# Patient Record
Sex: FEMALE | Employment: FULL TIME | URBAN - METROPOLITAN AREA
[De-identification: names, ages, dates, MRNs, and addresses within clinical notes are randomized per-mention and may not be internally consistent; named-entity substitution may affect disease eponyms.]

---

## 2017-03-13 RX ORDER — TRIAMCINOLONE ACETONIDE 1 MG/G
CREAM TOPICAL
Qty: 453 G | Refills: 0 | Status: SHIPPED | OUTPATIENT
Start: 2017-03-13 | End: 2017-06-26 | Stop reason: ALTCHOICE

## 2017-04-07 RX ORDER — CLOBETASOL PROPIONATE 0.5 MG/G
OINTMENT TOPICAL
Qty: 15 G | Refills: 1 | Status: SHIPPED | OUTPATIENT
Start: 2017-04-07 | End: 2017-12-18 | Stop reason: SDUPTHER

## 2017-06-01 DIAGNOSIS — Z00.00 ROUTINE GENERAL MEDICAL EXAMINATION AT A HEALTH CARE FACILITY: ICD-10-CM

## 2017-06-23 ENCOUNTER — HOSPITAL ENCOUNTER (OUTPATIENT)
Dept: LAB | Age: 37
Discharge: HOME | End: 2017-06-23

## 2017-06-23 LAB
A-G RATIO,AGRAT: 1.4 RATIO (ref 1.1–2.6)
ABSOLUTE LYMPHOCYTE COUNT, 10803: 2 K/UL (ref 1–4.8)
ALBUMIN SERPL-MCNC: 4.1 G/DL (ref 3.5–5)
ALP SERPL-CCNC: 65 U/L (ref 25–115)
ALT SERPL-CCNC: 13 U/L (ref 5–40)
ANION GAP SERPL CALC-SCNC: 14 MMOL/L
AST SERPL W P-5'-P-CCNC: 20 U/L (ref 10–37)
BASOPHILS # BLD: 0 K/UL (ref 0–0.2)
BASOPHILS NFR BLD: 0 % (ref 0–2)
BILIRUB SERPL-MCNC: 0.2 MG/DL (ref 0.2–1.2)
BUN SERPL-MCNC: 13 MG/DL (ref 6–22)
CALCIUM SERPL-MCNC: 9.1 MG/DL (ref 8.4–10.5)
CHLORIDE SERPL-SCNC: 99 MMOL/L (ref 98–110)
CHOLEST SERPL-MCNC: 215 MG/DL (ref 110–200)
CO2 SERPL-SCNC: 26 MMOL/L (ref 20–32)
CREAT SERPL-MCNC: 0.6 MG/DL (ref 0.5–1.2)
EOSINOPHIL # BLD: 0.2 K/UL (ref 0–0.5)
EOSINOPHIL NFR BLD: 4 % (ref 0–6)
ERYTHROCYTE [DISTWIDTH] IN BLOOD BY AUTOMATED COUNT: 12.4 % (ref 10–16)
GFRAA, 66117: >60
GFRNA, 66118: >60
GLOBULIN,GLOB: 2.9 G/DL (ref 2–4)
GLUCOSE SERPL-MCNC: 86 MG/DL (ref 65–99)
GRANULOCYTES,GRANS: 47 % (ref 40–75)
HCT VFR BLD AUTO: 41 % (ref 35.1–46.5)
HDLC SERPL-MCNC: 95 MG/DL (ref 40–59)
HGB BLD-MCNC: 13.7 G/DL (ref 11.7–15.5)
LDLC SERPL CALC-MCNC: 110 MG/DL (ref 50–99)
LYMPHOCYTES, LYMLT: 38 % (ref 27–45)
MCH RBC QN AUTO: 33 PG (ref 26–34)
MCHC RBC AUTO-ENTMCNC: 33 G/DL (ref 32–36)
MCV RBC AUTO: 97 FL (ref 80–95)
MONOCYTES # BLD: 0.6 K/UL (ref 0.1–0.9)
MONOCYTES NFR BLD: 12 % (ref 3–9)
NEUTROPHILS # BLD AUTO: 2.4 K/UL (ref 1.8–7.7)
PLATELET # BLD AUTO: 331 K/UL (ref 140–440)
PMV BLD AUTO: 10.7 FL (ref 6–10.8)
POTASSIUM SERPL-SCNC: 3.6 MMOL/L (ref 3.5–5.5)
PROT SERPL-MCNC: 7 G/DL (ref 6.4–8.3)
RBC # BLD AUTO: 4.22 M/UL (ref 3.8–5.2)
SENTARA SPECIMEN COL,SENBCF: NORMAL
SODIUM SERPL-SCNC: 139 MMOL/L (ref 133–145)
TRIGL SERPL-MCNC: 51 MG/DL (ref 40–149)
TSH SERPL DL<=0.005 MIU/L-ACNC: 2.44 MCU/ML (ref 0.27–4.2)
VLDLC SERPL CALC-MCNC: 10 MG/DL (ref 8–30)
WBC # BLD AUTO: 5.2 K/UL (ref 4–11)

## 2017-06-23 PROCEDURE — 99001 SPECIMEN HANDLING PT-LAB: CPT | Performed by: INTERNAL MEDICINE

## 2017-06-26 ENCOUNTER — OFFICE VISIT (OUTPATIENT)
Dept: INTERNAL MEDICINE CLINIC | Age: 37
End: 2017-06-26

## 2017-06-26 VITALS
RESPIRATION RATE: 12 BRPM | OXYGEN SATURATION: 94 % | TEMPERATURE: 98.1 F | HEIGHT: 66 IN | DIASTOLIC BLOOD PRESSURE: 62 MMHG | BODY MASS INDEX: 24.46 KG/M2 | SYSTOLIC BLOOD PRESSURE: 108 MMHG | WEIGHT: 152.2 LBS | HEART RATE: 93 BPM

## 2017-06-26 DIAGNOSIS — Z00.00 ROUTINE GENERAL MEDICAL EXAMINATION AT A HEALTH CARE FACILITY: Primary | ICD-10-CM

## 2017-06-26 NOTE — MR AVS SNAPSHOT
Visit Information Date & Time Provider Department Dept. Phone Encounter #  
 6/26/2017 10:00 AM Jonathan Salinas MD [facility] [address] [id number] Upcoming Health Maintenance Date Due Pneumococcal 19-64 Medium Risk (1 of 1 - PPSV23) 7/21/2000 DTaP/Tdap/Td series (1 - Tdap) 7/21/2002 PAP AKA CERVICAL CYTOLOGY 1/9/2017 INFLUENZA AGE 9 TO ADULT 8/1/2017 Allergies as of 6/26/2017  Review Complete On: 6/26/2017 By: Jonathan Salinas MD  
 No Known Allergies Current Immunizations  Reviewed on 6/9/2016 No immunizations on file. Not reviewed this visit You Were Diagnosed With   
  
 Codes Comments Routine general medical examination at a health care facility    -  Primary ICD-10-CM: Z00.00 ICD-9-CM: V70.0 Vitals BP Pulse Temp Resp Height(growth percentile) Weight(growth percentile) 108/62 93 98.1 °F (36.7 °C) (Oral) 12 5' 6\" (1.676 m) 152 lb 3.2 oz (69 kg) LMP SpO2 BMI OB Status Smoking Status 06/21/2017 94% 24.57 kg/m2 Having regular periods Never Smoker Vitals History BMI and BSA Data Body Mass Index Body Surface Area 24.57 kg/m 2 1.79 m 2 Preferred Pharmacy Pharmacy Name Phone [facility], [address] [place] [address] [phone] Your Updated Medication List  
  
   
This list is accurate as of: 6/26/17 10:36 AM.  Always use your most recent med list.  
  
  
  
  
 albuterol 90 mcg/actuation inhaler Commonly known as:  PROVENTIL HFA, VENTOLIN HFA, PROAIR HFA Take 2 Puffs by inhalation every four (4) hours as needed for Wheezing. clobetasol 0.05 % ointment Commonly known as:  TEMOVATE  
apply to affected area twice a day TRI-SPRINTEC (28) 0.18/0.215/0.25 mg-35 mcg (28) Tab Generic drug:  norgestimate-ethinyl estradiol Take  by mouth. Introducing [facility] & HE[facility] SERVICES! Dear Moriah [place]: Thank you for requesting a [employer] account. Our records indicate that you already have an active [employer] account. You can access your account anytime at [email or web address] Did you know that you can access your hospital and ER discharge instructions at any time in ELERTS? You can also review all of your test results from your hospital stay or ER visit. Additional Information If you have questions, please visit the Frequently Asked Questions section of the ELERTS website at [email or web address] Remember, ELERTS is NOT to be used for urgent needs. For medical emergencies, dial 911. Now available from your iPhone and Android! Please provide this summary of care documentation to your next provider. Your primary care clinician is listed as Ramírez Varela. Samanta Hayes. If you have any questions after today's visit, please call [phone].

## 2017-10-26 ENCOUNTER — TELEPHONE (OUTPATIENT)
Dept: INTERNAL MEDICINE CLINIC | Age: 37
End: 2017-10-26

## 2017-10-26 ENCOUNTER — OFFICE VISIT (OUTPATIENT)
Dept: INTERNAL MEDICINE CLINIC | Age: 37
End: 2017-10-26

## 2017-10-26 VITALS
WEIGHT: 155.6 LBS | HEIGHT: 66 IN | OXYGEN SATURATION: 96 % | BODY MASS INDEX: 25.01 KG/M2 | DIASTOLIC BLOOD PRESSURE: 68 MMHG | TEMPERATURE: 97.6 F | RESPIRATION RATE: 12 BRPM | SYSTOLIC BLOOD PRESSURE: 110 MMHG | HEART RATE: 51 BPM

## 2017-10-26 DIAGNOSIS — H69.82 DYSFUNCTION OF LEFT EUSTACHIAN TUBE: Primary | ICD-10-CM

## 2017-10-26 RX ORDER — PREDNISONE 10 MG/1
TABLET ORAL
Qty: 21 TAB | Refills: 0 | Status: SHIPPED | OUTPATIENT
Start: 2017-10-26 | End: 2019-05-06 | Stop reason: ALTCHOICE

## 2017-10-26 NOTE — MR AVS SNAPSHOT
Visit Information Date & Time Provider Department Dept. Phone Encounter #  
 10/26/2017 11:00 AM Marcin Leonard MD [facility] [place] [id number] Upcoming Health Maintenance Date Due Pneumococcal 19-64 Medium Risk (1 of 1 - PPSV23) 7/21/2000 DTaP/Tdap/Td series (1 - Tdap) 7/21/2002 INFLUENZA AGE 9 TO ADULT 8/1/2017 PAP AKA CERVICAL CYTOLOGY 5/4/2020 Allergies as of 10/26/2017  Review Complete On: 10/26/2017 By: Marcin Leonard MD  
 No Known Allergies Current Immunizations  Reviewed on 6/9/2016 No immunizations on file. Not reviewed this visit Vitals BP Pulse Temp Resp Height(growth percentile) Weight(growth percentile) 110/68 (!) 51 97.6 °F (36.4 °C) (Oral) 12 5' 6\" (1.676 m) 155 lb 9.6 oz (70.6 kg) SpO2 BMI OB Status Smoking Status 96% 25.11 kg/m2 Having regular periods Never Smoker Vitals History BMI and BSA Data Body Mass Index Body Surface Area  
 25.11 kg/m 2 1.81 m 2 Preferred Pharmacy Pharmacy Name Phone [facility], [facility] [phone] Your Updated Medication List  
  
   
This list is accurate as of: 10/26/17 11:38 AM.  Always use your most recent med list.  
  
  
  
  
 albuterol 90 mcg/actuation inhaler Commonly known as:  PROVENTIL HFA, VENTOLIN HFA, PROAIR HFA Take 2 Puffs by inhalation every four (4) hours as needed for Wheezing. clobetasol 0.05 % ointment Commonly known as:  TEMOVATE  
apply to affected area twice a day TRI-SPRINTEC (28) 0.18/0.215/0.25 mg-35 mcg (28) Tab Generic drug:  norgestimate-ethinyl estradiol Take  by mouth. Introducing [facility]! Dear Eloy Cardozo: 
Thank you for requesting a [employer] account. Our records indicate that you already have an active [employer] account. You can access your account anytime at [email or web address] Did you know that you can access your hospital and ER discharge instructions at any time in Own[employer]? You can also review all of your test results from your hospital stay or ER visit. Additional Information If you have questions, please visit the Frequently Asked Questions section of the Owned it website at [email or web address] Remember, Owned it is NOT to be used for urgent needs. For medical emergencies, dial 911. Now available from your iPhone and Android! Please provide this summary of care documentation to your next provider. Your primary care clinician is listed as Josafat James. If you have any questions after today's visit, please call [phone].

## 2017-10-26 NOTE — PROGRESS NOTES
Blossom Luong 1980, is a 40 y.o. female, who is seen today for evaluation of left ear discomfort. This started a week ago and has not changed since then. No real bad pain chest fullness/discomfort. Hearing is normal.  She does not have any nasal congestion or sore throat or chills or fever or other symptoms. She has not tried any medicine for this, she has not had an ear infection for over 20 years. Past Medical History:   Diagnosis Date    Asthma age 14    Chronic pain     back pain     Current Outpatient Prescriptions   Medication Sig Dispense Refill    clobetasol (TEMOVATE) 0.05 % ointment apply to affected area twice a day 15 g 1    albuterol (PROVENTIL HFA, VENTOLIN HFA, PROAIR HFA) 90 mcg/actuation inhaler Take 2 Puffs by inhalation every four (4) hours as needed for Wheezing. 1 Inhaler 2    norgestimate-ethinyl estradiol (TRI-SPRINTEC, 28,) 0.18/0.215/0.25 mg-35 mcg (28) tablet Take  by mouth. Visit Vitals    /68    Pulse (!) 51    Temp 97.6 °F (36.4 °C) (Oral)    Resp 12    Ht 5' 6\" (1.676 m)    Wt 155 lb 9.6 oz (70.6 kg)    SpO2 96%    BMI 25.11 kg/m2     The ear canal appears normal on the left and the tympanic membrane is slightly dull. No redness. Ear canal and tympanic membrane on the right appears normal with good light reflex. Nasal passages reveal minimal tenacious clear secretions bilaterally. Pharynx reveals no redness exudate or drainage. Neck reveals no adenopathy. No tenderness to press on the left ear. Assessment: Eustachian tube dysfunction probably from allergies. Will treat with prednisone 30 mg daily for a week, if this is not working she should try Sudafed or other decongestant. Her weight is normal with body mass index of 25. She will maintain normal weight. Robinson Patel MD FACP    Please note: This document has been produced using voice recognition software.  Unrecognized errors in transcription may be present.

## 2017-11-04 NOTE — PROGRESS NOTES
Laureano Belfast 1980, is a 29 y.o. female, who is seen today for routine physical exam.  She feels generally well but is working 12 hour shifts at night, 7-7 at the [employer] center. She is here with her 2 young children today. They keep her awfully busy as well. She feels well. She did have respiratory problems during the winter with exacerbation of asthma but is doing well now. She notes that her mother has had one lobe of her thyroid removed is a large nodule in the other lobe. Past Medical History:   Diagnosis Date    Asthma age 14    Chronic pain     back pain     Past Surgical History:   Procedure Laterality Date    HX APPENDECTOMY  1997    HX CHOLECYSTECTOMY  2007     Current Outpatient Prescriptions   Medication Sig Dispense Refill    clobetasol (TEMOVATE) 0.05 % ointment apply to affected area twice a day 15 g 1    albuterol (PROVENTIL HFA, VENTOLIN HFA, PROAIR HFA) 90 mcg/actuation inhaler Take 2 Puffs by inhalation every four (4) hours as needed for Wheezing. 1 Inhaler 2    norgestimate-ethinyl estradiol (TRI-SPRINTEC, 28,) 0.18/0.215/0.25 mg-35 mcg (28) tablet Take  by mouth. No Known Allergies  Social History     Social History    Marital status:      Spouse name: N/A    Number of children: N/A    Years of education: N/A     Social History Main Topics    Smoking status: Never Smoker    Smokeless tobacco: Never Used    Alcohol use Yes      Comment: social    Drug use: No    Sexual activity: Yes     Partners: Male     Birth control/ protection: Pill     Other Topics Concern    None     Social History Narrative     Visit Vitals    /62    Pulse 93    Temp 98.1 °F (36.7 °C) (Oral)    Resp 12    Ht 5' 6\" (1.676 m)    Wt 152 lb 3.2 oz (69 kg)    SpO2 94%    BMI 24.57 kg/m2     The patient is a well-developed well-nourished female in no apparent distress. HEENT: Pupils are equal and react to light and extraocular movements are full.   Ear canals and tympanic membranes appear normal. Oral cavity appears normal with no oral lesions. Neck: Carotids are 2+ without bruits. No adenopathy or thyromegaly. Lungs are clear to percussion. I hear no wheezing, rales or rhonchi. Heart reveals a regular rhythm with no murmur, gallop, click or rub. The apical impulse is in the fifth interspace at the midclavicular line. Abdomen is soft and nontender with no hepatosplenomegaly or masses. Bowel sounds are normoactive and there is no distention or tympany. Extremities reveal no clubbing cyanosis or edema. Pulses are 2+. Skin reveals no suspicious skin growths. Breasts reveal no masses, skin or nipple abnormalities. No axillary adenopathy. Laboratory shows normal CBC normal chemistries with glucose 86 LDL cholesterol 110 HDL 95 TSH 2.44    Assessment: Healthy young female, will continue fairly healthy diet, avoid significant weight gain. Follow-up in 1 year for physical    Roverto Nathan MD FACP    Please note: This document has been produced using voice recognition software. Unrecognized errors in transcription may be present. Unknown if ever smoked

## 2017-12-18 RX ORDER — CLOBETASOL PROPIONATE 0.5 MG/G
OINTMENT TOPICAL
Qty: 15 G | Refills: 0 | Status: SHIPPED | OUTPATIENT
Start: 2017-12-18 | End: 2018-03-05 | Stop reason: SDUPTHER

## 2018-03-05 RX ORDER — CLOBETASOL PROPIONATE 0.5 MG/G
OINTMENT TOPICAL
Qty: 15 G | Refills: 0 | Status: SHIPPED | OUTPATIENT
Start: 2018-03-05 | End: 2018-09-11 | Stop reason: SDUPTHER

## 2018-09-11 RX ORDER — CLOBETASOL PROPIONATE 0.5 MG/G
OINTMENT TOPICAL
Qty: 15 G | Refills: 0 | Status: SHIPPED | OUTPATIENT
Start: 2018-09-11 | End: 2018-12-12 | Stop reason: SDUPTHER

## 2018-12-12 RX ORDER — CLOBETASOL PROPIONATE 0.5 MG/G
OINTMENT TOPICAL
Qty: 15 G | Refills: 0 | Status: SHIPPED | OUTPATIENT
Start: 2018-12-12 | End: 2019-03-05 | Stop reason: SDUPTHER

## 2019-03-06 RX ORDER — CLOBETASOL PROPIONATE 0.5 MG/G
OINTMENT TOPICAL
Qty: 15 G | Refills: 0 | Status: SHIPPED | OUTPATIENT
Start: 2019-03-06 | End: 2019-09-30 | Stop reason: SDUPTHER

## 2019-03-11 DIAGNOSIS — R63.5 WEIGHT GAIN: ICD-10-CM

## 2019-03-11 DIAGNOSIS — Z00.00 ROUTINE GENERAL MEDICAL EXAMINATION AT A HEALTH CARE FACILITY: Primary | ICD-10-CM

## 2019-05-02 ENCOUNTER — HOSPITAL ENCOUNTER (OUTPATIENT)
Dept: LAB | Age: 39
Discharge: HOME | End: 2019-05-02

## 2019-05-02 LAB — SENTARA SPECIMEN COL,SENBCF: NORMAL

## 2019-05-02 PROCEDURE — 99001 SPECIMEN HANDLING PT-LAB: CPT

## 2019-05-03 LAB
A-G RATIO,AGRAT: 2 RATIO (ref 1.1–2.6)
ABSOLUTE LYMPHOCYTE COUNT, 10803: 2 K/UL (ref 1–4.8)
ALBUMIN SERPL-MCNC: 4.5 G/DL (ref 3.5–5)
ALP SERPL-CCNC: 68 U/L (ref 25–115)
ALT SERPL-CCNC: 18 U/L (ref 5–40)
ANION GAP SERPL CALC-SCNC: 15 MMOL/L
AST SERPL W P-5'-P-CCNC: 18 U/L (ref 10–37)
BASOPHILS # BLD: 0 K/UL (ref 0–0.2)
BASOPHILS NFR BLD: 0 % (ref 0–2)
BILIRUB SERPL-MCNC: 0.7 MG/DL (ref 0.2–1.2)
BUN SERPL-MCNC: 8 MG/DL (ref 6–22)
CALCIUM SERPL-MCNC: 9.2 MG/DL (ref 8.4–10.5)
CHLORIDE SERPL-SCNC: 99 MMOL/L (ref 98–110)
CHOLEST SERPL-MCNC: 190 MG/DL (ref 110–200)
CO2 SERPL-SCNC: 26 MMOL/L (ref 20–32)
CREAT SERPL-MCNC: 0.5 MG/DL (ref 0.5–1.2)
EOSINOPHIL # BLD: 0 K/UL (ref 0–0.5)
EOSINOPHIL NFR BLD: 1 % (ref 0–6)
ERYTHROCYTE [DISTWIDTH] IN BLOOD BY AUTOMATED COUNT: 12.7 % (ref 10–15.5)
GFRAA, 66117: >60
GFRNA, 66118: >60
GLOBULIN,GLOB: 2.3 G/DL (ref 2–4)
GLUCOSE SERPL-MCNC: 79 MG/DL (ref 70–99)
GRANULOCYTES,GRANS: 64 % (ref 40–75)
HCT VFR BLD AUTO: 41.7 % (ref 35.1–46.5)
HDLC SERPL-MCNC: 2.5 MG/DL (ref 0–5)
HDLC SERPL-MCNC: 75 MG/DL (ref 40–59)
HGB BLD-MCNC: 13.5 G/DL (ref 11.7–15.5)
LDLC SERPL CALC-MCNC: 101 MG/DL (ref 50–99)
LYMPHOCYTES, LYMLT: 27 % (ref 20–45)
MCH RBC QN AUTO: 32 PG (ref 26–34)
MCHC RBC AUTO-ENTMCNC: 32 G/DL (ref 31–36)
MCV RBC AUTO: 99 FL (ref 80–95)
MONOCYTES # BLD: 0.6 K/UL (ref 0.1–1)
MONOCYTES NFR BLD: 8 % (ref 3–12)
NEUTROPHILS # BLD AUTO: 4.7 K/UL (ref 1.8–7.7)
PLATELET # BLD AUTO: 333 K/UL (ref 140–440)
PMV BLD AUTO: 10.9 FL (ref 9–13)
POTASSIUM SERPL-SCNC: 4.1 MMOL/L (ref 3.5–5.5)
PROT SERPL-MCNC: 6.8 G/DL (ref 6.4–8.3)
RBC # BLD AUTO: 4.23 M/UL (ref 3.8–5.2)
SODIUM SERPL-SCNC: 140 MMOL/L (ref 133–145)
T4 FREE SERPL-MCNC: 1.2 NG/DL (ref 0.9–1.8)
TRIGL SERPL-MCNC: 67 MG/DL (ref 40–149)
TSH SERPL DL<=0.005 MIU/L-ACNC: 1 MCU/ML (ref 0.27–4.2)
VLDLC SERPL CALC-MCNC: 13 MG/DL (ref 8–30)
WBC # BLD AUTO: 7.4 K/UL (ref 4–11)

## 2019-05-06 ENCOUNTER — OFFICE VISIT (OUTPATIENT)
Dept: INTERNAL MEDICINE CLINIC | Age: 39
End: 2019-05-06

## 2019-05-06 VITALS
BODY MASS INDEX: 26.03 KG/M2 | OXYGEN SATURATION: 96 % | HEIGHT: 66 IN | SYSTOLIC BLOOD PRESSURE: 104 MMHG | HEART RATE: 52 BPM | TEMPERATURE: 97.9 F | DIASTOLIC BLOOD PRESSURE: 70 MMHG | RESPIRATION RATE: 14 BRPM | WEIGHT: 162 LBS

## 2019-05-06 DIAGNOSIS — J45.20 MILD INTERMITTENT ASTHMA WITHOUT COMPLICATION: ICD-10-CM

## 2019-05-06 DIAGNOSIS — Z00.00 ROUTINE GENERAL MEDICAL EXAMINATION AT A HEALTH CARE FACILITY: Primary | ICD-10-CM

## 2019-05-06 NOTE — PROGRESS NOTES
Chief Complaint Patient presents with  Complete Physical  
  ROOM 10   Yearly physical with lab results. Patient states that her heart rate has been really low for the past month. Patient states that when she runs her chest gets tight and she thinks it is he asthma. Dr. Jenny Bocanegra notified. Health Maintenance Due Topic Date Due  Pneumococcal 0-64 years (1 of 1 - PPSV23) 07/21/1987  
 DTaP/Tdap/Td series (1 - Tdap) 07/21/2002 1. Have you been to the ER, urgent care clinic or hospitalized since your last visit? NO.  
 
2. Have you seen or consulted any other health care providers outside of the [address] [place] [address] since your last visit (Include any pap smears or colon screening)? YES, GYN last week for PAP. Do you have an Advanced Directive? NO Would you like information on Advanced Directives? NO Learning Assessment 5/6/2019 PRIMARY LEARNER Patient HIGHEST LEVEL OF EDUCATION - PRIMARY LEARNER  SOME COLLEGE  
BARRIERS PRIMARY LEARNER NONE  
CO-LEARNER CAREGIVER No  
PRIMARY LANGUAGE ENGLISH  NEED No  
LEARNER PREFERENCE PRIMARY READING  
  DEMONSTRATION  
ANSWERED BY patient RELATIONSHIP SELF

## 2019-05-06 NOTE — PROGRESS NOTES
Shahid Vidal 1980, is a 41 y.o. female, who is seen today for routine physical exam and follow-up on asthma. Her asthma is doing well, she occasionally uses albuterol especially she is going to exercise and albuterol as a couple years old, it still works and she has not gone through her last inhaler. She has gained 7 pounds in the last 18 months and is not real happy about that. 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 She saw her gynecologist about a week ago. Past Medical History:  
Diagnosis Date  Asthma age 14  Chronic pain   
 back pain Past Surgical History:  
Procedure Laterality Date [address]  HX CHOLECYSTECTOMY  2007 Current Outpatient Medications Medication Sig Dispense Refill  Multivitamins with Fluoride (MULTI-VITAMIN PO) Take  by mouth.  Lactobacillus acidophilus (PROBIOTIC PO) Take  by mouth.  clobetasol (TEMOVATE) 0.05 % ointment apply to affected area twice a day 15 g 0  
 albuterol (PROVENTIL HFA, VENTOLIN HFA, PROAIR HFA) 90 mcg/actuation inhaler Take 2 Puffs by inhalation every four (4) hours as needed for Wheezing. 1 Inhaler 2  
 norgestimate-ethinyl estradiol (TRI-SPRINTEC, 28,) 0.18/0.215/0.25 mg-35 mcg (28) tablet Take  by mouth. No Known Allergies Social History Socioeconomic History  Marital status:  Spouse name: Not on file  Number of children: Not on file  Years of education: Not on file  Highest education level: Not on file Tobacco Use  Smoking status: Never Smoker  Smokeless tobacco: Never Used Substance and Sexual Activity  Alcohol use: Yes Comment: 1-2 drinks a month.  Drug use: No  
 Sexual activity: Yes  
  Partners: Male Birth control/protection: Pill Visit Vitals /70 (BP 1 Location: Left arm, BP Patient Position: Sitting) Pulse (!) 52 Temp 97.9 °F (36.6 °C) (Oral) Resp 14 Ht 5' 5.75\" (1.67 m) Wt 162 lb (73.5 kg) SpO2 96% BMI 26.35 kg/m² Ear canals reveal moderate wax on the right and none on the left. Oral cavity reveals no lesions. Neck reveals no adenopathy or thyromegaly. Carotids are 2+. Lungs are clear to percussion. Good breath sounds with no wheezing or crackles. Heart reveals a regular rhythm with normal S1 and S2 no murmur gallop click or rub. Apical impulse is not palpable. Abdomen is soft and nontender with no hepatosplenomegaly or masses and no bruits. Extremities reveal no clubbing cyanosis or edema. Pulses are 2+. Breasts reveal no masses no skin or nipple abnormalities and no x-ray adenopathy. Results for orders placed or performed in visit on 05/02/19 LIPID PANEL Result Value Ref Range Triglyceride 67 40 - 149 mg/dL HDL Cholesterol 75 (H) 40 - 59 mg/dL Cholesterol, total 190 110 - 200 mg/dL CHOLESTEROL/HDL 2.5 0.0 - 5.0 LDL, calculated 101 (H) 50 - 99 mg/dL VLDL, calculated 13 8 - 30 mg/dL METABOLIC PANEL, COMPREHENSIVE Result Value Ref Range Glucose 79 70 - 99 mg/dL BUN 8 6 - 22 mg/dL Creatinine 0.5 0.5 - 1.2 mg/dL Sodium 140 133 - 145 mmol/L Potassium 4.1 3.5 - 5.5 mmol/L Chloride 99 98 - 110 mmol/L  
 CO2 26 20 - 32 mmol/L  
 AST (SGOT) 18 10 - 37 U/L  
 ALT (SGPT) 18 5 - 40 U/L Alk. phosphatase 68 25 - 115 U/L Bilirubin, total 0.7 0.2 - 1.2 mg/dL Calcium 9.2 8.4 - 10.5 mg/dL Protein, total 6.8 6.4 - 8.3 g/dL Albumin 4.5 3.5 - 5.0 g/dL A-G Ratio 2.0 1.1 - 2.6 ratio Globulin 2.3 2.0 - 4.0 g/dL Anion gap 15.0 mmol/L  
 GFRAA >60.0 >60.0 GFRNA >60.0 >60.0 T4, FREE Result Value Ref Range T4, Free 1.2 0.9 - 1.8 ng/dL TSH 3RD GENERATION Result Value Ref Range TSH 1.00 0.27 - 4.20 mcU/mL CBC WITH AUTOMATED DIFF Result Value Ref Range WBC 7.4 4.0 - 11.0 K/uL  
 RBC 4.23 3.80 - 5.20 M/uL  
 HGB 13.5 11.7 - 15.5 g/dL HCT 41.7 35.1 - 46.5 % MCV 99 (H) 80 - 95 fL  
 MCH 32 26 - 34 pg MCHC 32 31 - 36 g/dL  
 RDW 12.7 10.0 - 15.5 % PLATELET 374 615 - 783 K/uL MPV 10.9 9.0 - 13.0 fL  
 NEUTROPHILS 64 40 - 75 % Lymphocytes 27 20 - 45 % MONOCYTES 8 3 - 12 % EOSINOPHILS 1 0 - 6 % BASOPHILS 0 0 - 2 %  
 ABS. NEUTROPHILS 4.7 1.8 - 7.7 K/uL ABSOLUTE LYMPHOCYTE COUNT 2.0 1.0 - 4.8 K/uL  
 ABS. MONOCYTES 0.6 0.1 - 1.0 K/uL  
 ABS. EOSINOPHILS 0.0 0.0 - 0.5 K/uL  
 ABS. BASOPHILS 0.0 0.0 - 0.2 K/uL Assessment: #1. History of asthma doing well, she will use albuterol when needed and call for refill when needed. #2. She has gained 7 pounds in the last 18 months but weight is only minimally above her ideal body weight, further weight gain and try to lose a few pounds in the next year. #3.  Her heart rate is low normal and has been noted in this range before. She is healthy and further evaluation is needed unless symptoms develop his heart rate goes down into the lower 40s. Follow-up in 1 year for a physical or sooner if needed Robinson Rich Kamiah, [address] Please note: This document has been produced using voice recognition software. Unrecognized errors in transcription may be present.

## 2019-06-24 RX ORDER — ALBUTEROL SULFATE 90 UG/1
2 AEROSOL, METERED RESPIRATORY (INHALATION)
Qty: 1 INHALER | Refills: 2 | Status: SHIPPED | OUTPATIENT
Start: 2019-06-24

## 2019-06-24 NOTE — TELEPHONE ENCOUNTER
Last Visit: 05/06/2019 with MD Costa Orellana  Next Appointment: noted to f/u in one year  Previous Refill Encounter(s): 06/28/2016 per MD Costa Orellana 1 inhaler 2R    Requested Prescriptions     Pending Prescriptions Disp Refills    albuterol (PROVENTIL HFA, VENTOLIN HFA, PROAIR HFA) 90 mcg/actuation inhaler 1 Inhaler 2     Sig: Take 2 Puffs by inhalation every four (4) hours as needed for Wheezing.

## 2019-09-30 RX ORDER — CLOBETASOL PROPIONATE 0.5 MG/G
OINTMENT TOPICAL
Qty: 15 G | Refills: 0 | Status: SHIPPED | OUTPATIENT
Start: 2019-09-30 | End: 2020-01-08

## 2020-01-08 RX ORDER — CLOBETASOL PROPIONATE 0.5 MG/G
OINTMENT TOPICAL
Qty: 15 G | Refills: 0 | Status: SHIPPED | OUTPATIENT
Start: 2020-01-08 | End: 2020-06-09 | Stop reason: SDUPTHER

## 2020-06-08 ENCOUNTER — TELEPHONE (OUTPATIENT)
Dept: INTERNAL MEDICINE CLINIC | Age: 40
End: 2020-06-08

## 2020-06-08 NOTE — TELEPHONE ENCOUNTER
Left breast feels tight when taking a breath. She has never had a mammogram but she thinks she may need one or possible ultrasound. She is mentioning an area of concern around the left nipple. Please advise.

## 2020-06-09 ENCOUNTER — OFFICE VISIT (OUTPATIENT)
Dept: INTERNAL MEDICINE CLINIC | Age: 40
End: 2020-06-09

## 2020-06-09 VITALS
DIASTOLIC BLOOD PRESSURE: 71 MMHG | OXYGEN SATURATION: 100 % | TEMPERATURE: 97.7 F | BODY MASS INDEX: 24.53 KG/M2 | RESPIRATION RATE: 12 BRPM | SYSTOLIC BLOOD PRESSURE: 98 MMHG | HEART RATE: 70 BPM | WEIGHT: 152.6 LBS | HEIGHT: 66 IN

## 2020-06-09 DIAGNOSIS — R07.89 ANTERIOR CHEST WALL PAIN: Primary | ICD-10-CM

## 2020-06-09 RX ORDER — CLOBETASOL PROPIONATE 0.5 MG/G
OINTMENT TOPICAL 2 TIMES DAILY
Qty: 15 G | Refills: 2 | Status: SHIPPED | OUTPATIENT
Start: 2020-06-09

## 2020-06-09 NOTE — PROGRESS NOTES
Marcela Esqueda 1980, is a 46 y.o. female, who is seen today for chest pain and dyspnea. She was running 2 weeks ago when she fell forward and scraped her palms and fell on her chest but at that time just the hands hurt her and not really the chest.  About 3 days ago she started noticing all of a sudden discomfort in the left breast area just above the nipple region and also when she lays down on her back or on her left side it hurts in that area makes her feel short of breath. She has not had wheezing or coughing and it does not feel like asthma peer she has had no chills or dyspnea. With walking around at home without a mask she is not particularly short of breath but with it is hurting the chest she feels a bit short of breath. She does not feel like she cannot take a deep breath. She tells me that she worries about her breast though she has notable now for needing screening mammography. There is no pain around to the back or side of her chest.    Past Medical History:   Diagnosis Date    Asthma age 14    Chronic pain     back pain     Current Outpatient Medications   Medication Sig Dispense Refill    clobetasol (TEMOVATE) 0.05 % ointment apply to affected area twice a day 15 g 0    albuterol (PROVENTIL HFA, VENTOLIN HFA, PROAIR HFA) 90 mcg/actuation inhaler Take 2 Puffs by inhalation every four (4) hours as needed for Wheezing. 1 Inhaler 2    Multivitamins with Fluoride (MULTI-VITAMIN PO) Take  by mouth.  Lactobacillus acidophilus (PROBIOTIC PO) Take  by mouth.  norgestimate-ethinyl estradiol (TRI-SPRINTEC, 28,) 0.18/0.215/0.25 mg-35 mcg (28) tablet Take  by mouth.          Visit Vitals  BP 98/71 (BP 1 Location: Left arm, BP Patient Position: Sitting)   Pulse 70   Temp 97.7 °F (36.5 °C) (Oral)   Resp 12   Ht 5' 5.7\" (1.669 m)   Wt 152 lb 9.6 oz (69.2 kg)   SpO2 100%   BMI 24.86 kg/m²     There is good range of motion of the left shoulder with no discomfort into the chest or shoulder. There is no visible bruising over any of her chest.  Her breasts are symmetric with no induration and there is no breast tenderness. With pressing on the ribs just above and medial to the left breast there is rather marked tenderness and with pushing the breast tissue away from that area and feeling deeper into the chest it is again very tender in the ribs. Good breath sounds throughout with no wheezing or crackles or rub. Heart reveals a regular rhythm with normal S1-S2 no murmur. Assessment: She clearly has chest wall pain, it is the ribs that are tender at several levels, 3 or 4 levels, with no indication of fracture and certainly no evidence of breast nodularity or tenderness or visible bruise. No indication of pleurisy or pericarditis on exam or by symptoms. I have explained to her that she is having chest wall pain and that she does not need to worry about it, she does not need a chest x-ray or rib x-rays or EKG based on current findings but she will call me if symptoms worsen or new symptoms develop. I told her I cannot tell for sure if the chest wall pain is from the fall that she sustained 2 weeks ago but that seems most likely, though sometimes chest wall pain will develop in a young lady without any injury as I explained to her as well. Follow-up PRN    This visit lasted 25 minutes, greater than 50% of the time spent counseling, going over the above issues as detailed above. Robinson Whitt MD FACP    Please note: This document has been produced using voice recognition software. Unrecognized errors in transcription may be present.

## 2020-07-15 ENCOUNTER — TELEPHONE (OUTPATIENT)
Dept: INTERNAL MEDICINE CLINIC | Age: 40
End: 2020-07-15

## 2020-07-15 RX ORDER — METRONIDAZOLE 500 MG/1
TABLET ORAL
Qty: 2 TAB | Refills: 1 | Status: SHIPPED | OUTPATIENT
Start: 2020-07-15

## 2020-07-15 NOTE — TELEPHONE ENCOUNTER
Patient accidentally threw away her last 2  doses of Flagyl 500mg tablets. Please send these 2 tabs to her pharmacy so she can finish out her prescription. She went to [facility] last week and that's what they gave her for 7 days to take.

## 2022-08-27 NOTE — PROGRESS NOTES
1. Have you been to the ER, urgent care clinic or hospitalized since your last visit? NO.     2. Have you seen or consulted any other health care providers outside of the [address] [place] [address] since your last visit (Include any pap smears or colon screening)? NO      Do you have an Advanced Directive? NO    Would you like information on Advanced Directives?  NO show